# Patient Record
Sex: MALE | Race: WHITE | NOT HISPANIC OR LATINO
[De-identification: names, ages, dates, MRNs, and addresses within clinical notes are randomized per-mention and may not be internally consistent; named-entity substitution may affect disease eponyms.]

---

## 2022-04-15 PROBLEM — Z00.00 ENCOUNTER FOR PREVENTIVE HEALTH EXAMINATION: Status: ACTIVE | Noted: 2022-04-15

## 2023-03-01 ENCOUNTER — NON-APPOINTMENT (OUTPATIENT)
Age: 63
End: 2023-03-01

## 2023-03-03 ENCOUNTER — APPOINTMENT (OUTPATIENT)
Dept: OTOLARYNGOLOGY | Facility: CLINIC | Age: 63
End: 2023-03-03
Payer: MEDICAID

## 2023-03-03 ENCOUNTER — NON-APPOINTMENT (OUTPATIENT)
Age: 63
End: 2023-03-03

## 2023-03-03 VITALS
HEART RATE: 6 BPM | SYSTOLIC BLOOD PRESSURE: 97 MMHG | HEIGHT: 68 IN | BODY MASS INDEX: 30.31 KG/M2 | WEIGHT: 200 LBS | DIASTOLIC BLOOD PRESSURE: 65 MMHG

## 2023-03-03 DIAGNOSIS — K21.9 GASTRO-ESOPHAGEAL REFLUX DISEASE W/OUT ESOPHAGITIS: ICD-10-CM

## 2023-03-03 DIAGNOSIS — J37.0 CHRONIC LARYNGITIS: ICD-10-CM

## 2023-03-03 DIAGNOSIS — K44.9 DIAPHRAGMATIC HERNIA W/OUT OBSTRUCTION OR GANGRENE: ICD-10-CM

## 2023-03-03 PROCEDURE — 31575 DIAGNOSTIC LARYNGOSCOPY: CPT

## 2023-03-03 PROCEDURE — 99204 OFFICE O/P NEW MOD 45 MIN: CPT | Mod: 25

## 2023-03-03 NOTE — REVIEW OF SYSTEMS
[Hoarseness] : hoarseness [Throat Clearing] : throat clearing [Shortness Of Breath] : shortness of breath [Wheezing] : wheezing [Cough] : cough [Heartburn] : heartburn [Negative] : Heme/Lymph [Patient Intake Form Reviewed] : Patient intake form was reviewed [FreeTextEntry7] : reflux

## 2023-03-03 NOTE — ASSESSMENT
[FreeTextEntry1] : SMOKING AGGRAVATING FACTOR\par STOP SMOKING DISCUSSED\par VIDEOSTROBOSCOPY\par DIET\par HUMIDIFIER\par F/U AFTER ABOVE

## 2023-03-03 NOTE — HISTORY OF PRESENT ILLNESS
[de-identified] : HUSKY VOICE FOR THE PAST 2 MONTHS\par HEAVY SMOKING\par NO DYSPHANGIA\par MEDICAL HX REVIEWED\par HIATAL HERNIA

## 2023-03-21 ENCOUNTER — APPOINTMENT (OUTPATIENT)
Dept: OTOLARYNGOLOGY | Facility: CLINIC | Age: 63
End: 2023-03-21
Payer: MEDICAID

## 2023-03-21 PROCEDURE — 31579 LARYNGOSCOPY TELESCOPIC: CPT

## 2023-03-23 DIAGNOSIS — J38.3 OTHER DISEASES OF VOCAL CORDS: ICD-10-CM

## 2023-03-23 NOTE — ASSESSMENT
[FreeTextEntry1] : Speech/ Language/ Voice Evaluation and REPORT OF EVALUATION OF VOCAL FUNCTION VIA VIDEOSTROBOSCOPY AND BEHAVIORAL/PERCEPTUAL ANALYSIS:\par \par History: Information on this patient has been provided by the patient and via chart review. Gadiel Vazquez is 62 year-old male who was seen for a videostroboscopy and behavioral voice evaluation to formally assess vocal function. He was alert and cooperative for the entirety of today’s assessment.\par \par Reason For Referral: To formally assess vocal function. Mr. Vazquez arrived independently to today’s evaluation, at which time he was alert, pleasant and cooperative. The patient presents with complaints of a hoarse/husky voice, which has been on and off over the past couple of months. He states some days are better than others. He denies complete loss of voice. The patient reports cessation of cigarette smoking approximately 3 weeks ago. He continues to vape, but has cut back. The patient also reports history of acid reflux and hiatal hernia, which he states is under control. He takes omeprazole and famotidine, and has made diet / lifestyle modifications. The patient denies dysphagia, recent history of pneumonia and unintentional weight loss.\par \par Current Respiratory Status: _x_ Normal ___ Tracheostomy Tube\par \par Medical History, as per charting:\par Active Problems\par Chronic laryngitis (476.0) (J37.0)\par GERD (gastroesophageal reflux disease) (530.81) (K21.9)\par Hiatal hernia (553.3) (K44.9)\par \par Past Medical History\par Unreviewed Unverified: History of No known problems (V49.89) (Z78.9)\par \par Surgical History\par Unreviewed Unverified: No history of surgery\par \par Family History\par Unreviewed Unverified: No pertinent family history\par \par Social History\par Unreviewed Unverified: Current smoker on some days (305.1) (F17.200)\par \par Allergies\par amoxicillin\par \par VIDEOSTROBOSCOPY:\par The patient was explained the videostroboscopy procedure, and consent was obtained. A nasendoscope was passed via the left naris and positioned above the supraglottic structures. Foamy secretions were noted to extend from the nasopharynx into the hypopharynx along the posterior pharyngeal wall. The epiglottis, aryepiglottic folds, arytenoids, and true vocal folds were clearly visible with increased hypervascularity observed throughout the laryngeal structures. In addition, a small, cyst-like mass was located on the R aryepiglottic fold. Assessment of the TVFs revealed edema and erythema bilaterally. There was bright red pigmentation noted anteriorly along the cords, judged consistent with possible hemorrhagic changes. White plaque-like changes were observed along the middle third of the R TVF. In addition, a small, round, white-juan prominence was intermittently visualized at the posterior third of the R TVF. There was adequate abduction of the TVFs during quiet inhalation/exhalation. When asked to engage in phonation, the TVFs were both mobile with full closure during phonation. There was relatively normal amplitude and magnitude of mucosal wave. At this point, testing was discontinued and the scope carefully removed, with the patient tolerating the procedure without difficulty.\par \par BEHAVIORAL VOICE EVALUATION:\par Perceptually, Mr. Vazquez presented with a mild to moderate dysphonia marked by a hoarse, harsh quality with reduced intensity and limited pitch range. Vocal deficits were further exacerbated by increased perilaryngeal tension and reduced respiratory-phonatory coordination. Maximum phonation time was also reduced when compared to age-matched peers. Resonance was WFL.\par \par IMPRESSIONS: Dysphonia\par \par PROGNOSIS: Pending MD treatment plan\par \par RECOMMENDATIONS:\par 1) Voice Therapy (CPT - 06132) is recommended 1x per week, for 6-8 weeks, to maximize vocal function\par 2) Adherence to vocal hygiene parameters (i.e., increase hydration, implement vocal rest, cessation of smoking, etc.), as reviewed with pt upon completion of today's evaluation. A written educational handout was also provided.\par 3) Follow up with referring MD for treatment plan\par \par EDUCATION: The above recommendations were discussed with the patient.\par \par Should you have additional questions/concerns, please contact this office at [phone].\par \par Cathleen Cain M.S., CCC-SLP\par \par \par \par

## 2023-04-11 ENCOUNTER — APPOINTMENT (OUTPATIENT)
Dept: CT IMAGING | Facility: CLINIC | Age: 63
End: 2023-04-11
Payer: MEDICAID

## 2023-04-11 PROCEDURE — 70491 CT SOFT TISSUE NECK W/DYE: CPT

## 2023-06-12 RX ORDER — FLUTICASONE PROPIONATE 50 UG/1
50 SPRAY, METERED NASAL
Qty: 1 | Refills: 3 | Status: ACTIVE | COMMUNITY
Start: 2023-06-12 | End: 1900-01-01

## 2024-07-05 ENCOUNTER — APPOINTMENT (OUTPATIENT)
Dept: OTOLARYNGOLOGY | Facility: CLINIC | Age: 64
End: 2024-07-05
Payer: MEDICAID

## 2024-07-05 VITALS
SYSTOLIC BLOOD PRESSURE: 104 MMHG | BODY MASS INDEX: 29.55 KG/M2 | DIASTOLIC BLOOD PRESSURE: 69 MMHG | HEART RATE: 73 BPM | WEIGHT: 195 LBS | HEIGHT: 68 IN

## 2024-07-05 DIAGNOSIS — J34.89 OTHER SPECIFIED DISORDERS OF NOSE AND NASAL SINUSES: ICD-10-CM

## 2024-07-05 DIAGNOSIS — K21.9 GASTRO-ESOPHAGEAL REFLUX DISEASE W/OUT ESOPHAGITIS: ICD-10-CM

## 2024-07-05 PROCEDURE — 31575 DIAGNOSTIC LARYNGOSCOPY: CPT

## 2024-07-05 PROCEDURE — 99213 OFFICE O/P EST LOW 20 MIN: CPT | Mod: 25

## 2024-07-05 RX ORDER — ALBUTEROL SULFATE 90 UG/1
INHALANT RESPIRATORY (INHALATION)
Refills: 0 | Status: ACTIVE | COMMUNITY

## 2024-07-05 RX ORDER — FAMOTIDINE 40 MG/1
40 TABLET, FILM COATED ORAL
Refills: 0 | Status: ACTIVE | COMMUNITY

## 2024-07-05 RX ORDER — VALACYCLOVIR 500 MG/1
500 TABLET, FILM COATED ORAL
Refills: 0 | Status: ACTIVE | COMMUNITY

## 2024-07-05 RX ORDER — MUPIROCIN 20 MG/G
2 OINTMENT TOPICAL
Qty: 1 | Refills: 5 | Status: ACTIVE | COMMUNITY
Start: 2024-07-05 | End: 1900-01-01

## 2024-09-24 ENCOUNTER — OUTPATIENT (OUTPATIENT)
Dept: OUTPATIENT SERVICES | Facility: HOSPITAL | Age: 64
LOS: 1 days | End: 2024-09-24
Payer: MEDICAID

## 2024-09-24 VITALS
OXYGEN SATURATION: 80 % | DIASTOLIC BLOOD PRESSURE: 65 MMHG | RESPIRATION RATE: 20 BRPM | SYSTOLIC BLOOD PRESSURE: 100 MMHG | TEMPERATURE: 98 F | HEIGHT: 66 IN | WEIGHT: 199.08 LBS | HEART RATE: 82 BPM

## 2024-09-24 DIAGNOSIS — Z98.890 OTHER SPECIFIED POSTPROCEDURAL STATES: Chronic | ICD-10-CM

## 2024-09-24 DIAGNOSIS — L03.019 CELLULITIS OF UNSPECIFIED FINGER: ICD-10-CM

## 2024-09-24 DIAGNOSIS — Z01.818 ENCOUNTER FOR OTHER PREPROCEDURAL EXAMINATION: ICD-10-CM

## 2024-09-24 DIAGNOSIS — L03.011 CELLULITIS OF RIGHT FINGER: ICD-10-CM

## 2024-09-24 LAB
ALBUMIN SERPL ELPH-MCNC: 3.7 G/DL — SIGNIFICANT CHANGE UP (ref 3.3–5)
ALP SERPL-CCNC: 88 U/L — SIGNIFICANT CHANGE UP (ref 30–120)
ALT FLD-CCNC: 18 U/L — SIGNIFICANT CHANGE UP (ref 10–60)
ANION GAP SERPL CALC-SCNC: 10 MMOL/L — SIGNIFICANT CHANGE UP (ref 5–17)
AST SERPL-CCNC: 26 U/L — SIGNIFICANT CHANGE UP (ref 10–40)
BILIRUB SERPL-MCNC: 0.3 MG/DL — SIGNIFICANT CHANGE UP (ref 0.2–1.2)
BUN SERPL-MCNC: 20 MG/DL — SIGNIFICANT CHANGE UP (ref 7–23)
CALCIUM SERPL-MCNC: 9.4 MG/DL — SIGNIFICANT CHANGE UP (ref 8.4–10.5)
CHLORIDE SERPL-SCNC: 102 MMOL/L — SIGNIFICANT CHANGE UP (ref 96–108)
CO2 SERPL-SCNC: 28 MMOL/L — SIGNIFICANT CHANGE UP (ref 22–31)
CREAT SERPL-MCNC: 1.2 MG/DL — SIGNIFICANT CHANGE UP (ref 0.5–1.3)
EGFR: 68 ML/MIN/1.73M2 — SIGNIFICANT CHANGE UP
GLUCOSE SERPL-MCNC: 94 MG/DL — SIGNIFICANT CHANGE UP (ref 70–99)
HCT VFR BLD CALC: 36.9 % — LOW (ref 39–50)
HGB BLD-MCNC: 11.4 G/DL — LOW (ref 13–17)
MCHC RBC-ENTMCNC: 24 PG — LOW (ref 27–34)
MCHC RBC-ENTMCNC: 30.9 G/DL — LOW (ref 32–36)
MCV RBC AUTO: 77.7 FL — LOW (ref 80–100)
NRBC # BLD: 0 /100 WBCS — SIGNIFICANT CHANGE UP (ref 0–0)
PLATELET # BLD AUTO: 316 K/UL — SIGNIFICANT CHANGE UP (ref 150–400)
POTASSIUM SERPL-MCNC: 3.9 MMOL/L — SIGNIFICANT CHANGE UP (ref 3.5–5.3)
POTASSIUM SERPL-SCNC: 3.9 MMOL/L — SIGNIFICANT CHANGE UP (ref 3.5–5.3)
PROT SERPL-MCNC: 7.9 G/DL — SIGNIFICANT CHANGE UP (ref 6–8.3)
RBC # BLD: 4.75 M/UL — SIGNIFICANT CHANGE UP (ref 4.2–5.8)
RBC # FLD: 17.8 % — HIGH (ref 10.3–14.5)
SODIUM SERPL-SCNC: 140 MMOL/L — SIGNIFICANT CHANGE UP (ref 135–145)
WBC # BLD: 9.33 K/UL — SIGNIFICANT CHANGE UP (ref 3.8–10.5)
WBC # FLD AUTO: 9.33 K/UL — SIGNIFICANT CHANGE UP (ref 3.8–10.5)

## 2024-09-24 PROCEDURE — 93005 ELECTROCARDIOGRAM TRACING: CPT

## 2024-09-24 PROCEDURE — 80053 COMPREHEN METABOLIC PANEL: CPT

## 2024-09-24 PROCEDURE — G0463: CPT

## 2024-09-24 PROCEDURE — 36415 COLL VENOUS BLD VENIPUNCTURE: CPT

## 2024-09-24 PROCEDURE — 93010 ELECTROCARDIOGRAM REPORT: CPT

## 2024-09-24 PROCEDURE — 85027 COMPLETE CBC AUTOMATED: CPT

## 2024-09-24 NOTE — H&P PST ADULT - ASSESSMENT
64 yo male is scheduled for excision accessory nail radial aspect long right finger with ablation of matrix radially and nail bed repair right on 10/11/2024

## 2024-09-24 NOTE — H&P PST ADULT - NSICDXFAMILYHX_GEN_ALL_CORE_FT
FAMILY HISTORY:  Father  Still living? No  Family history of abdominal aortic aneurysm (AAA) repair, Age at diagnosis: Age Unknown    Sibling  Still living? No  Family history of lymphoma, Age at diagnosis: Age Unknown

## 2024-09-24 NOTE — H&P PST ADULT - NSICDXPASTMEDICALHX_GEN_ALL_CORE_FT
PAST MEDICAL HISTORY:  Anxiety     Atrial fibrillation     Cellulitis of finger     Chronic cough     Chronic dyspnea     Class 1 obesity with body mass index (BMI) of 32.0 to 32.9 in adult     Dyslipidemia     GERD (gastroesophageal reflux disease)     Herpes virus disease     Hiatal hernia     Oxygen dependent     Pulmonary fibrosis     Pulmonary hypertension     SOB (shortness of breath)

## 2024-09-24 NOTE — H&P PST ADULT - PROBLEM SELECTOR PLAN 1
Medical, cardiac and pulmonary clearances  Instructions for xarelto   pre op instructions and best wishes given

## 2024-09-24 NOTE — H&P PST ADULT - RESPIRATORY
no wheezes/no use of accessory muscles/diminished breath sounds, L/diminished breath sounds, R/subcutaneous emphysema

## 2024-10-11 ENCOUNTER — TRANSCRIPTION ENCOUNTER (OUTPATIENT)
Age: 64
End: 2024-10-11

## 2024-10-11 ENCOUNTER — OUTPATIENT (OUTPATIENT)
Dept: OUTPATIENT SERVICES | Facility: HOSPITAL | Age: 64
LOS: 1 days | End: 2024-10-11
Payer: MEDICAID

## 2024-10-11 VITALS
HEART RATE: 66 BPM | OXYGEN SATURATION: 95 % | SYSTOLIC BLOOD PRESSURE: 110 MMHG | RESPIRATION RATE: 14 BRPM | DIASTOLIC BLOOD PRESSURE: 88 MMHG

## 2024-10-11 VITALS
HEIGHT: 68 IN | DIASTOLIC BLOOD PRESSURE: 68 MMHG | TEMPERATURE: 97 F | SYSTOLIC BLOOD PRESSURE: 112 MMHG | WEIGHT: 198.64 LBS | OXYGEN SATURATION: 97 % | HEART RATE: 66 BPM | RESPIRATION RATE: 15 BRPM

## 2024-10-11 DIAGNOSIS — Z98.890 OTHER SPECIFIED POSTPROCEDURAL STATES: Chronic | ICD-10-CM

## 2024-10-11 DIAGNOSIS — L03.011 CELLULITIS OF RIGHT FINGER: ICD-10-CM

## 2024-10-11 PROCEDURE — 88304 TISSUE EXAM BY PATHOLOGIST: CPT

## 2024-10-11 PROCEDURE — 88312 SPECIAL STAINS GROUP 1: CPT | Mod: 26

## 2024-10-11 PROCEDURE — 88312 SPECIAL STAINS GROUP 1: CPT

## 2024-10-11 PROCEDURE — 11750 EXCISION NAIL&NAIL MATRIX: CPT | Mod: F7

## 2024-10-11 PROCEDURE — 88304 TISSUE EXAM BY PATHOLOGIST: CPT | Mod: 26

## 2024-10-11 RX ORDER — DILTIAZEM HCL 300 MG
1 CAPSULE, EXTENDED RELEASE 24HR ORAL
Refills: 0 | DISCHARGE

## 2024-10-11 RX ORDER — CHLORHEXIDINE GLUCONATE ORAL RINSE 1.2 MG/ML
1 SOLUTION DENTAL ONCE
Refills: 0 | Status: COMPLETED | OUTPATIENT
Start: 2024-10-11 | End: 2024-10-11

## 2024-10-11 RX ORDER — FAMOTIDINE 40 MG
1 TABLET ORAL
Refills: 0 | DISCHARGE

## 2024-10-11 RX ORDER — VALACYCLOVIR 1000 MG/1
1 TABLET ORAL
Refills: 0 | DISCHARGE

## 2024-10-11 RX ORDER — ROSUVASTATIN CALCIUM 20 MG/1
1 TABLET, COATED ORAL
Refills: 0 | DISCHARGE

## 2024-10-11 RX ORDER — ESCITALOPRAM OXALATE 10 MG
1 TABLET ORAL
Refills: 0 | DISCHARGE

## 2024-10-11 RX ORDER — ALBUTEROL 90 MCG
2 AEROSOL (GRAM) INHALATION
Refills: 0 | DISCHARGE

## 2024-10-11 RX ORDER — APREPITANT 125MG-80MG
40 KIT ORAL ONCE
Refills: 0 | Status: COMPLETED | OUTPATIENT
Start: 2024-10-11 | End: 2024-10-11

## 2024-10-11 RX ORDER — NINTEDANIB 100 MG/1
1 CAPSULE ORAL
Refills: 0 | DISCHARGE

## 2024-10-11 RX ORDER — FLUTICASONE PROPIONATE 50 UG/1
1 SPRAY, METERED NASAL
Refills: 0 | DISCHARGE

## 2024-10-11 RX ORDER — RIVAROXABAN 10 MG/1
1 TABLET, FILM COATED ORAL
Refills: 0 | DISCHARGE

## 2024-10-11 RX ORDER — CEFAZOLIN SODIUM 1 G
2000 VIAL (EA) INJECTION ONCE
Refills: 0 | Status: COMPLETED | OUTPATIENT
Start: 2024-10-11 | End: 2024-10-11

## 2024-10-11 RX ADMIN — CHLORHEXIDINE GLUCONATE ORAL RINSE 1 APPLICATION(S): 1.2 SOLUTION DENTAL at 12:36

## 2024-10-11 NOTE — ASU DISCHARGE PLAN (ADULT/PEDIATRIC) - ASU DC SPECIAL INSTRUCTIONSFT
rest  ice  wiggle fingers  *** do not remove  bandage  MD  will remove in office  Keep area clean and dry  Do not submerge in water  Follow up primary Care MD call to confirm appt  Follow up Dr. Jara office call office to confirm appt  questions please call Dr. Jara office  Tylenol 500mg  2  tablets every  8 hours for pain as needed

## 2024-10-11 NOTE — ASU DISCHARGE PLAN (ADULT/PEDIATRIC) - NS MD DC FALL RISK RISK
For information on Fall & Injury Prevention, visit: [email or web address] OR  [email or web address] OR  https://www.cdc.gov/steadi/patient.html

## 2024-10-11 NOTE — BRIEF OPERATIVE NOTE - NSICDXBRIEFPROCEDURE_GEN_ALL_CORE_FT
PROCEDURES:  Partial excision of nail 11-Oct-2024 18:41:55  Maite Evangelista  Ablation, nail bed, finger 11-Oct-2024 18:42:13  Maite Evangelista  Block, nerve, median 11-Oct-2024 18:42:26  Maite Evangelista

## 2024-10-11 NOTE — ASU DISCHARGE PLAN (ADULT/PEDIATRIC) - CARE PROVIDER_API CALL
Jill Jara [facility]  Orthopaedic Surgery  [address] [place] [id number]  Phone: [phone]  Fax: [phone]  Follow Up Time: 2 weeks

## 2025-03-21 ENCOUNTER — NON-APPOINTMENT (OUTPATIENT)
Age: 65
End: 2025-03-21

## 2025-03-25 ENCOUNTER — APPOINTMENT (OUTPATIENT)
Dept: RHEUMATOLOGY | Facility: CLINIC | Age: 65
End: 2025-03-25

## (undated) DEVICE — PLV-SCD MACHINE: Type: DURABLE MEDICAL EQUIPMENT

## (undated) DEVICE — GLV 7.5 PROTEXIS (BLUE)

## (undated) DEVICE — GLV 7 PROTEXIS (WHITE)

## (undated) DEVICE — TOURNIQUET CUFF 18" DUAL PORT DUAL BLADDER W PLC (BLACK)

## (undated) DEVICE — DRSG ACE BANDAGE 3"

## (undated) DEVICE — SOL IRR POUR H2O 1000ML

## (undated) DEVICE — VENODYNE/SCD SLEEVE CALF LARGE

## (undated) DEVICE — DRAPE TOWEL BLUE 17" X 24"

## (undated) DEVICE — SOL IRR POUR NS 0.9% 1000ML

## (undated) DEVICE — DRSG XEROFORM 1 X 8"

## (undated) DEVICE — PREP CHLORAPREP HI-LITE ORANGE 26ML

## (undated) DEVICE — SLING ARM CHIEFTAIN MESH MEDIUM

## (undated) DEVICE — SLING ARM CHIEFTAIN MESH XL

## (undated) DEVICE — VENODYNE/SCD SLEEVE CALF MEDIUM

## (undated) DEVICE — DRSG WEBRIL 3"

## (undated) DEVICE — DRAPE 3/4 SHEET W REINFORCEMENT 56X77"

## (undated) DEVICE — PACK HAND

## (undated) DEVICE — SLING ARM CHIEFTAIN MESH LARGE

## (undated) DEVICE — DRAPE U (BLUE) 60 X 60"

## (undated) DEVICE — SUT MONOSOF 4-0 18" P-12

## (undated) DEVICE — SUT MONOSOF 3-0 18" P-12

## (undated) DEVICE — TOURNIQUET CUFF 24" DUAL PORT SINGLE BLADDER W PLC (BLACK)

## (undated) DEVICE — IMMOBILIZER HAND ALUMINUM XL

## (undated) DEVICE — WARMING BLANKET LOWER ADULT

## (undated) DEVICE — DRSG STOCKINETTE IMPERVIOUS MED

## (undated) DEVICE — DRAPE INSTRUMENT POUCH 6.75" X 11"

## (undated) DEVICE — DRAPE LIGHT HANDLE COVER (GREEN)

## (undated) DEVICE — ELCTR BIPOLAR CORD J&J 12FT DISP